# Patient Record
Sex: MALE | Race: WHITE | NOT HISPANIC OR LATINO | URBAN - METROPOLITAN AREA
[De-identification: names, ages, dates, MRNs, and addresses within clinical notes are randomized per-mention and may not be internally consistent; named-entity substitution may affect disease eponyms.]

---

## 2023-03-29 ENCOUNTER — APPOINTMENT (RX ONLY)
Dept: URBAN - METROPOLITAN AREA CLINIC 76 | Facility: CLINIC | Age: 78
Setting detail: DERMATOLOGY
End: 2023-03-29

## 2023-03-29 DIAGNOSIS — L29.8 OTHER PRURITUS: ICD-10-CM

## 2023-03-29 DIAGNOSIS — L29.89 OTHER PRURITUS: ICD-10-CM

## 2023-03-29 PROCEDURE — ? COUNSELING

## 2023-03-29 PROCEDURE — ? TREATMENT REGIMEN

## 2023-03-29 PROCEDURE — 99202 OFFICE O/P NEW SF 15 MIN: CPT

## 2023-03-29 ASSESSMENT — LOCATION SIMPLE DESCRIPTION DERM: LOCATION SIMPLE: ANTERIOR SCALP

## 2023-03-29 ASSESSMENT — LOCATION ZONE DERM: LOCATION ZONE: SCALP

## 2023-03-29 ASSESSMENT — LOCATION DETAILED DESCRIPTION DERM: LOCATION DETAILED: MID-FRONTAL SCALP

## 2023-03-29 NOTE — HPI: ITCHING (SCALP)
How Did The Scalp Condition Occur?: sudden in onset (over a period of weeks to a few months)
How Severe Is The Scalp Condition?: moderate
Additional History: Patient has been seeing another dermatologist and was given Ketoconazole shampoo, selenium lotion, Betamethasone lotion and methyl prednisone. This dermatologist suggested that the itching, burning, and pain might be neurological related. Areas on scalp but especially the crown is tender at times as well as itchy.

## 2023-03-29 NOTE — PROCEDURE: TREATMENT REGIMEN
Plan: Patient is to see neurologist, history of migraines. If everything checks out with neuro patient will come back in for a PBX to rule out burning scalp syndrome. Treatment may include pulse dye laser.
Detail Level: Detailed